# Patient Record
Sex: MALE | Race: BLACK OR AFRICAN AMERICAN
[De-identification: names, ages, dates, MRNs, and addresses within clinical notes are randomized per-mention and may not be internally consistent; named-entity substitution may affect disease eponyms.]

---

## 2020-06-18 NOTE — OPERATIVE REPORT
Operative Report


DATE OF SURGERY: 06/18/20


PREOPERATIVE DIAGNOSIS: Right primary hip osteoarthritis


POSTOPERATIVE DIAGNOSIS: Right severe primary hip osteoarthritis


OPERATION: Right total hip arthroplasty


SURGEON: TONI RIVERS JR


ANESTHESIA: Spinal


COMPLICATIONS: 





none


ESTIMATED BLOOD LOSS: 200


PROCEDURE: 





Implants: [place] Accolade size 6 femoral stem with 127 offset, a Trident size 

56 cup, and a standard 36 liner, a +2.5 neck length 36 mm ceramic head





BRIEF HISTORY: 80 year old male with severe degenerative arthritis of right hip,

which has failed conservative treatment and has elected for a total hip 

arthroplasty. Risks include but are not limited to bleeding, infection, 

anesthesia, death, injury to nerve or vessel, pain, scar, leg length inequality,

dislocation, future surgery, and blood clots. Patient read through the pre-op 

counseling form and signed and solicited for surgery on their right hip.





OPERATIVE PROCEDURE: Patient was brought to the operating room on and underwent 

spinal anesthesia.  3 grams of Ancef and 1 g of vancomycin was given.  After 

proper anesthesia was obtained, patient was positioned, padded, prepped, and 

draped in the usual sterile fashion on the operating room table.  Appropriate 

time out was performed.  An anterior approach to the hip was undertaken with 

meticulous hemostasis through the deep interval.  A capsulectomy was performed 

followed by exposure of the femoral neck.  The femoral neck was cut in line with

the femoral broach and the femoral head was removed. The acetabulum was then 

exposed with three retractors in an atraumatic fashion. Soft tissue and 

osteophytes were removed. Medialization reaming was performed followed by 

anatomic reaming up to accept a 56 mm acetabulum.  Wound was irrigated with 

dilute betadyne solution and the 56 mm acetabulum was impacted into correct 

position and stability checked by manipulating the impaction handle which rocked

the pelvis.  A standard liner was impacted into the shell with good stability. 

Potential impinging osteophytes were removed. 





Attention was then directed toward the femur, which was exposed with two 

retractors in an atraumatic fashion.  A bone hook was placed to carefully 

perform releases along the superior capsule until the femur was safely delivered

through the wound.  A  was utilized followed by lateralization rasping

and then broaching up to accept a size 6 femur. With a lateral offset neck and a

36 head, stability was good in flexion and extension with near equal leg 

lengths. The real lateral offset femur was impacted into a copiously irrigated 

femoral canal. A 36 mm ceramic head was impacted on a clean dry femoral taper. 

The hip was irrigated and reduced, further irrigation with antibiotic solution, 

betadine solution, then antibiotic solution.  Bleeders were coagulated with 

bovie cautery. The fascia was then closed with number 2 Stratofix; the 

subcutaneous tissue closed with interrupted inverted 2-0 monocryl then running 

3-0 monocryl subcuticular.   Dermabond skin glue was applied followed by a 

silver dressing.  All needle sponge and instrument counts were correct.  Patient

was awakened from sedation anesthesia and taken to recovery room in good 

condition.  





Thank you,





Toni Rivers, DO

## 2020-06-18 NOTE — DISCHARGE SUMMARY
Discharge Summary (SDC)





- Discharge


Final Diagnosis: 





right total hip arthroplasty


Date of Surgery: 06/18/20


Discharge Date: 06/18/20


Condition: Stable


Treatment or Instructions: 


Right total hip arthroplasty


Referrals: 


LEXA RIVERS JR, DO [ACTIVE PROVISIONAL STAFF] - 


Respiratory Treatments at Home: Deep Breathing/Coughing


Discharge Activity: Activity As Tolerated, No Driving, Keep Legs Elevated, No 

Lifting Over 10 Pounds, No Lifting/Push/Pulling, No tub bath, Walk Frequently


Adaptive Devices on Discharge: Straight Cane, Rolling Walker


Report the Following to Your Physician Immediately: Shortness of Breath, Fever 

over 101 Degrees, Unusual Bleeding, Drainage-Yellow

## 2020-06-18 NOTE — RADIOLOGY REPORT (SQ)
EXAM DESCRIPTION:  HIP RIGHT AP/LATERAL



IMAGES COMPLETED DATE/TIME:  6/18/2020 10:33 am



REASON FOR STUDY:  post op check for placement  M16.4  BILATERAL POST-TRAUMATIC OSTEOARTHRITIS OF HIP




COMPARISON:  3/17/2020.



NUMBER OF VIEWS:  Two view(s).



TECHNIQUE:  Digital radiographic images of the right hip post-procedure.



LIMITATIONS:  None.



FINDINGS:  BONES: No worrisome or unexpected findings post-procedure.

DEVICE: Bi-polar prothesis. Device appears in appropriate location.

SOFT TISSUES:  No worrisome findings.  Expected postoperative soft tissue changes.



IMPRESSION:   SATISFACTORY POSTOPERATIVE RIGHT HIP.



TECHNICAL DOCUMENTATION:  JOB ID:  [id number]

 2011 [employer]- All Rights Reserved



Reading location - IP/workstation name: [email or web address]

## 2020-06-18 NOTE — RADIOLOGY REPORT (SQ)
EXAM DESCRIPTION:  NO CHG FLUORO; HIP IN OPERATING RM



IMAGES COMPLETED DATE/TIME:  6/18/2020 3:21 pm



REASON FOR STUDY:  RIGHT HIP ARTHROPLASTY ASSISTED WITH FLUORO IN OR



COMPARISON:  None.



FLUOROSCOPY TIME:  No measurable fluoro time

3 Images saved to PACS



LIMITATIONS:  None.



PROCEDURE:  Right hip arthroplasty.



FINDINGS:  Images from fluoro document the procedure.



IMPRESSION:  Right hip arthroplasty.  Refer to operative note for further information.



COMMENT:  PQRS [id number]:  Fluoroscopy time of the procedure is documented in the report.



TECHNICAL DOCUMENTATION:  JOB ID:  [id number]

 2011 [employer]- All Rights Reserved



Reading location - IP/workstation name: [email or web address]

## 2020-07-02 ENCOUNTER — HOSPITAL ENCOUNTER (OUTPATIENT)
Dept: HOSPITAL 62 - OROUT | Age: 50
End: 2020-07-02
Payer: OTHER GOVERNMENT

## 2020-07-02 DIAGNOSIS — Z53.09: Primary | ICD-10-CM

## 2020-07-02 DIAGNOSIS — Z79.01: ICD-10-CM

## 2024-02-23 NOTE — PROGRESS NOTE
Provider Note


Provider Note: 





Patient presented to my office this past Monday.  He had his postoperative 

dressing in place that had been there for approximately 12 days.  The dressing 

was not saturated but there was a very small amount of leakage from the wound 

superiorly.  When I examined him in my office on Monday I was able to continue 

expressing very small amounts of serous fluid from the proximal aspect of the 

wound.  At that time I re-dressed the wound and took the patient off of his 

anticoagulant.  I also gave him a short course of Duricef.  





Today he presented to [facility] for reevaluation and potential 

I&D of the right hip.  Upon evaluation and the preoperative area he had a 

dressing in place that was applied yesterday.  Over the 16-hour or greater 

period there was no drainage in his bandage.  The wound appeared healthy without

any signs of infection.  I pressed about the proximal aspect of the wound was 

not able to express any serous fluid.  Due to the excellent appearance we have 

allowed the patient to go home and canceled his potential I&D today.  He is to 

continue to avoid aspirin until Monday.  If there is no further drainage on 

Monday then he can resume aspirin at that time.  He is to complete his course of

Duricef as well.  Given that he is no longer taking his aspirin for the weekend 

I have counseled him at the potential signs and symptoms of a blood clot and 

encouraged him to be vigilant to return to his local ER soon as possible if he 

has any of these concerns.  The patient demonstrated understanding and all 

questions were answered.
No